# Patient Record
Sex: FEMALE | Race: BLACK OR AFRICAN AMERICAN | Employment: FULL TIME | URBAN - METROPOLITAN AREA
[De-identification: names, ages, dates, MRNs, and addresses within clinical notes are randomized per-mention and may not be internally consistent; named-entity substitution may affect disease eponyms.]

---

## 2019-04-18 ENCOUNTER — OFFICE VISIT (OUTPATIENT)
Dept: ORTHOPEDIC SURGERY | Age: 59
End: 2019-04-18

## 2019-04-18 VITALS
SYSTOLIC BLOOD PRESSURE: 144 MMHG | HEIGHT: 66 IN | TEMPERATURE: 97.8 F | RESPIRATION RATE: 17 BRPM | BODY MASS INDEX: 41.21 KG/M2 | HEART RATE: 87 BPM | WEIGHT: 256.4 LBS | DIASTOLIC BLOOD PRESSURE: 122 MMHG | OXYGEN SATURATION: 95 %

## 2019-04-18 DIAGNOSIS — M25.562 CHRONIC PAIN OF LEFT KNEE: ICD-10-CM

## 2019-04-18 DIAGNOSIS — M17.0 PRIMARY OSTEOARTHRITIS OF BOTH KNEES: Primary | ICD-10-CM

## 2019-04-18 DIAGNOSIS — G89.29 CHRONIC PAIN OF RIGHT KNEE: ICD-10-CM

## 2019-04-18 DIAGNOSIS — M25.561 CHRONIC PAIN OF RIGHT KNEE: ICD-10-CM

## 2019-04-18 DIAGNOSIS — G89.29 CHRONIC PAIN OF LEFT KNEE: ICD-10-CM

## 2019-04-18 PROBLEM — E66.01 OBESITY, MORBID (HCC): Status: ACTIVE | Noted: 2019-04-18

## 2019-04-18 RX ORDER — TRIAMCINOLONE ACETONIDE 40 MG/ML
40 INJECTION, SUSPENSION INTRA-ARTICULAR; INTRAMUSCULAR ONCE
Qty: 1 ML | Refills: 0
Start: 2019-04-18 | End: 2019-04-18

## 2019-04-18 NOTE — LETTER
NOTIFICATION RETURN TO WORK / SCHOOL 
 
4/18/2019 10:24 AM 
 
Ms. Nellie Kendrick [address] [place] [address] To Whom It May Concern: Nellie Kendrick is currently under the care of [address] [place] [address] Keith Frazier. She was seen at the office today, 4/18/19. Please excuse. If there are questions or concerns please have the patient contact our office. Sincerely, Nona Smyth MD

## 2019-04-18 NOTE — PROGRESS NOTES
Yoselyn Ventura 1960 Chief Complaint Patient presents with  Knee Pain ROLLY KNEE PAIN  
  
 
HISTORY OF PRESENT ILLNESS Yoselyn Ventura is a 63 y.o. female who presents today for evaluation of b/l knee pain. R>L. She rates her pain 4/10 today. Pain has been present for awhile, gradually getting worse. She has h/o of arthroscopic in the right knee years ago. She has had cortisone injections and Euflexxa injection in the past. Patient describes the pain as aching and dull that is Intermittent in nature. Symptoms are worse with standing and walking , Activity and is better with  Rest. Associated symptoms include Swelling. Since problem started, it: has worsened. Pain does not wake patient up at night. Has taken no meds for the problem. Has tried following treatments: Injections:NO; Brace:NO; Therapy:NO; Cane/Crutch:NO No Known Allergies Past Medical History:  
Diagnosis Date  Arthritis Social History Socioeconomic History  Marital status: SINGLE Spouse name: Not on file  Number of children: Not on file  Years of education: Not on file  Highest education level: Not on file Occupational History  Not on file Social Needs  Financial resource strain: Not on file  Food insecurity:  
  Worry: Not on file Inability: Not on file  Transportation needs:  
  Medical: Not on file Non-medical: Not on file Tobacco Use  Smoking status: Current Every Day Smoker  Smokeless tobacco: Never Used Substance and Sexual Activity  Alcohol use: Not on file  Drug use: Not on file  Sexual activity: Not on file Lifestyle  Physical activity:  
  Days per week: Not on file Minutes per session: Not on file  Stress: Not on file Relationships  Social connections:  
  Talks on phone: Not on file Gets together: Not on file Attends Yazidi service: Not on file Active member of club or organization: Not on file Attends meetings of clubs or organizations: Not on file Relationship status: Not on file  Intimate partner violence:  
  Fear of current or ex partner: Not on file Emotionally abused: Not on file Physically abused: Not on file Forced sexual activity: Not on file Other Topics Concern  Not on file Social History Narrative  Not on file Past Surgical History:  
Procedure Laterality Date  HX KNEE ARTHROSCOPY Family History Problem Relation Age of Onset  Heart Disease Mother  Diabetes Mother  No Known Problems Father Current Outpatient Medications Medication Sig  
 triamcinolone acetonide (KENALOG) 40 mg/mL injection 1 mL by IntraMUSCular route once for 1 dose. No current facility-administered medications for this visit. REVIEW OF SYSTEM Patient denies: Weight loss, Fever/Chills, HA, Visual changes, Fatigue, Chest pain, SOB, Abdominal pain, N/V/D/C, Blood in stool or urine, Edema. Pertinent positive as above in HPI. All others were negative PHYSICAL EXAM:  
Visit Vitals BP (!) 144/122 Pulse 87 Temp 97.8 °F (36.6 °C) (Oral) Resp 17 Ht 5' 5.5\" (1.664 m) Wt 256 lb 6.4 oz (116.3 kg) SpO2 95% BMI 42.02 kg/m² The patient is a well-developed, well-nourished female   in no acute distress. The patient is alert and oriented times three. The patient is alert and oriented times three. Mood and affect are normal. 
LYMPHATIC: lymph nodes are not enlarged and are within normal limits SKIN: normal in color and non tender to palpation. There are no bruises or abrasions noted. NEUROLOGICAL: Motor sensory exam is within normal limits. Reflexes are equal bilaterally. There is normal sensation to pinprick and light touch MUSCULOSKELETAL: 
Examination Left knee Right knee Skin Intact Intact Range of motion   Effusion + + Medial joint line tenderness + + Lateral joint line tenderness + + Tenderness Pes Bursa - - Tenderness insertion MCL - - Tenderness insertion LCL - - Marimars - - Patella crepitus + + Patella grind + + Lachman - - Pivot shift - - Anterior drawer - - Posterior drawer - - Varus stress - -  
Valgus stress - - Neurovascular Intact Intact Calf Swelling and Tenderness to Palpation - - Jermaine's Test - -  
Hamstring Cord Tightness - - PROCEDURE: After sterile prep, 4 cc of Xylocaine and 1 cc of Kenalog were injected into the bilateral  knees. [facility] PROCEDURE PROGRESS NOTE Chart reviewed for the following: 
Tj Adams MD, have reviewed the History, Physical and updated the Allergic reactions for Loreto Barefoot TIME OUT performed immediately prior to start of procedure: 
Tj Adams MD, have performed the following reviews on Loreto Barefoot prior to the start of the procedure: 
         
* Patient was identified by name and date of birth * Agreement on procedure being performed was verified * Risks and Benefits explained to the patient * Procedure site verified and marked as necessary * Patient was positioned for comfort * Consent was signed and verified Time: 10:05 AM 
 
Date of procedure: 4/18/2019 Procedure performed by:  Trevon Wilson MD 
 
Provider assisted by: (see medication administration) How tolerated by patient: tolerated the procedure well with no complications Comments: none IMAGING: XR of b/l knee dated 4/18/19 was reviewed and read: decreased medial joint line space with degenerative spurring and moderately severe degenerative changes in the patellofemoral joint. IMPRESSION:   
  ICD-10-CM ICD-9-CM 1. Primary osteoarthritis of both knees M17.0 715.16 TRIAMCINOLONE ACETONIDE INJ  
   triamcinolone acetonide (KENALOG) 40 mg/mL injection DRAIN/INJECT LARGE JOINT/BURSA    PROCEDURE AUTHORIZATION TO   
 2. Chronic pain of right knee M25.561 719.46 AMB POC XRAY, KNEE; 1/2 VIEWS  
 G89.29 338.29   
3. Chronic pain of left knee M25.562 719.46 AMB POC XRAY, KNEE; 1/2 VIEWS  
 G89.29 338.29 PLAN: 
1. The patient presents today with b/l knee pain due to XR documented osteoarthritis and I would like to try cortisone injections today. Risk factors include: n/a 2. No ultrasound exam indicated today 3. Yes cortisone injection indicated today B/L KNEES 4. No Physical/Occupational Therapy indicated today 5. No diagnostic test indicated today: 6. No durable medical equipment indicated today 7. No referral indicated today 8. No medications indicated today: 9. No Narcotic indicated today RTC 3 weeks if pain continues Scribed by Namrata Wang ([address]) as dictated by Pritesh Hyman MD 
 
I, Dr. Pritesh Hyman, confirm that all documentation is accurate.  
 
Pritesh Hyman M.D.  
[facility]

## 2019-04-18 NOTE — PROGRESS NOTES
1. Have you been to the ER, urgent care clinic since your last visit? Hospitalized since your last visit? No 
 
2. Have you seen or consulted any other health care providers outside of the [address] [place] [address] since your last visit? Include any pap smears or colon screening.  No

## 2019-05-09 ENCOUNTER — OFFICE VISIT (OUTPATIENT)
Dept: ORTHOPEDIC SURGERY | Age: 59
End: 2019-05-09

## 2019-05-09 VITALS
HEART RATE: 67 BPM | BODY MASS INDEX: 40.72 KG/M2 | WEIGHT: 253.4 LBS | OXYGEN SATURATION: 100 % | SYSTOLIC BLOOD PRESSURE: 130 MMHG | DIASTOLIC BLOOD PRESSURE: 87 MMHG | HEIGHT: 66 IN | TEMPERATURE: 98 F

## 2019-05-09 DIAGNOSIS — M54.16 LUMBAR RADICULOPATHY: ICD-10-CM

## 2019-05-09 DIAGNOSIS — M17.0 PRIMARY OSTEOARTHRITIS OF BOTH KNEES: Primary | ICD-10-CM

## 2019-05-09 RX ORDER — CELECOXIB 200 MG/1
200 CAPSULE ORAL 2 TIMES DAILY WITH MEALS
Qty: 60 CAP | Refills: 0 | Status: SHIPPED | OUTPATIENT
Start: 2019-05-09 | End: 2019-06-09 | Stop reason: SDUPTHER

## 2019-05-09 NOTE — PROGRESS NOTES
Danielito Jennings  1960   Chief Complaint   Patient presents with    Knee Pain     Bilateral        HISTORY OF PRESENT ILLNESS  Danielito Jennings is a 63 y.o. female who presents today for reevaluation of b/l knee pain. Patient rates pain as 2/10 today. At last OV, patient had a cortisone injection which provided good relief. She states her left leg goes numb after she does prolonged walking. Pain has been present for awhile, gradually getting worse. She has h/o of arthroscopic in the right knee years ago. She has had cortisone injections and Euflexxa injection in the past. Patient denies any fever, chills, chest pain, shortness of breath or calf pain. The remainder of the review of systems is negative. There are no new illness or injuries to report since last seen in the office. There are no changes to medications, allergies, family or social history. PHYSICAL EXAM:   Visit Vitals  /87 (BP 1 Location: Right arm, BP Patient Position: Sitting)   Pulse 67   Temp 98 °F (36.7 °C) (Oral)   Ht 5' 5.5\" (1.664 m)   Wt 253 lb 6.4 oz (114.9 kg)   SpO2 100%   BMI 41.53 kg/m²     The patient is a well-developed, well-nourished female   in no acute distress. The patient is alert and oriented times three. The patient is alert and oriented times three. Mood and affect are normal.  LYMPHATIC: lymph nodes are not enlarged and are within normal limits  SKIN: normal in color and non tender to palpation. There are no bruises or abrasions noted. NEUROLOGICAL: Motor sensory exam is within normal limits. Reflexes are equal bilaterally.  There is normal sensation to pinprick and light touch  MUSCULOSKELETAL:  Examination Left knee Right knee   Skin Intact Intact   Range of motion     Effusion - -   Medial joint line tenderness - -   Lateral joint line tenderness - -   Tenderness Pes Bursa - -   Tenderness insertion MCL - -   Tenderness insertion LCL - -   Marimars - -   Patella crepitus + +   Patella grind + +   Lachman - -   Pivot shift - -   Anterior drawer - -   Posterior drawer - -   Varus stress - -   Valgus stress - -   Neurovascular Intact Intact   Calf Swelling and Tenderness to Palpation - -   Jermaine's Test - -   Hamstring Cord Tightness - -     IMAGING: XR of b/l knee dated 4/18/19 was reviewed and read: decreased medial joint line space with degenerative spurring and moderately severe degenerative changes in the patellofemoral joint. IMPRESSION:      ICD-10-CM ICD-9-CM    1. Primary osteoarthritis of both knees M17.0 715.16 celecoxib (CELEBREX) 200 mg capsule   2. Lumbar radiculopathy M54.16 724.4 REFERRAL TO SPINE SURGERY      celecoxib (CELEBREX) 200 mg capsule        PLAN:   1. The patient presents today with b/l knee pain which was helped by the cortisone injections. Risk factors include: n/a  2. No ultrasound exam indicated today  3. No cortisone injection indicated today   4. No Physical/Occupational Therapy indicated today  5. No diagnostic test indicated today:   6. Yes durable medical equipment indicated today  7. Yes referral indicated today [place]  8. Yes medications indicated today: CELEBREX  9. No Narcotic indicated today    RTC prn    Scribed by Colton Holbrook [facility]) as dictated by Allegra Srinivasan MD    I, Dr. Allegra Srinivasan, confirm that all documentation is accurate.     Allegra Srinivasan M.D.   [address] [place] [address] and Spine Specialist

## 2019-05-10 ENCOUNTER — TELEPHONE (OUTPATIENT)
Dept: ORTHOPEDIC SURGERY | Age: 59
End: 2019-05-10

## 2019-05-10 NOTE — TELEPHONE ENCOUNTER
Submitted prior auth through covermymeds: [place] Case ID: [id number] - Rx #: [id number]. Prior auth approved, Status:Approved; Review Type:Prior Auth; Coverage Start Date:04/10/2019; Coverage End Date:05/09/2020;

## 2019-05-10 NOTE — TELEPHONE ENCOUNTER
Please use South: Kamila Villanueva to initiate a prior authorization for Celebrex through Cover My Meds or contact the pharmacy to change the medication.

## 2019-06-09 DIAGNOSIS — M17.0 PRIMARY OSTEOARTHRITIS OF BOTH KNEES: ICD-10-CM

## 2019-06-09 DIAGNOSIS — M54.16 LUMBAR RADICULOPATHY: ICD-10-CM

## 2019-06-10 RX ORDER — CELECOXIB 200 MG/1
CAPSULE ORAL
Qty: 60 CAP | Refills: 0 | Status: SHIPPED | OUTPATIENT
Start: 2019-06-10

## 2019-07-31 ENCOUNTER — OFFICE VISIT (OUTPATIENT)
Dept: ORTHOPEDIC SURGERY | Age: 59
End: 2019-07-31

## 2019-07-31 VITALS
DIASTOLIC BLOOD PRESSURE: 84 MMHG | HEIGHT: 66 IN | SYSTOLIC BLOOD PRESSURE: 140 MMHG | TEMPERATURE: 98 F | HEART RATE: 75 BPM | BODY MASS INDEX: 41.5 KG/M2 | RESPIRATION RATE: 12 BRPM | OXYGEN SATURATION: 97 % | WEIGHT: 258.2 LBS

## 2019-07-31 DIAGNOSIS — M17.0 PRIMARY OSTEOARTHRITIS OF BOTH KNEES: ICD-10-CM

## 2019-07-31 DIAGNOSIS — G57.12 MERALGIA PARAESTHETICA, LEFT: Primary | ICD-10-CM

## 2019-07-31 RX ORDER — DICLOFENAC SODIUM 10 MG/G
4 GEL TOPICAL 4 TIMES DAILY
Qty: 500 G | Refills: 1 | Status: SHIPPED | OUTPATIENT
Start: 2019-07-31

## 2019-07-31 NOTE — PROGRESS NOTES
Verbal Order per Dr. Maryam Larson placed Voltaren gel 4 gram to affected areas QID dispensed 500 grams with 1 refill.  Prescription printed per patient request.

## 2019-07-31 NOTE — PROGRESS NOTES
[address]  [place], [address] [place] [address] Street  Phone: [phone]  Fax: [phone]  : 1960  PCP: Rekha Monsalve MD      NEW PATIENT      ASSESSMENT AND PLAN     Diagnoses and all orders for this visit:    1. Meralgia paraesthetica, left  -     diclofenac (VOLTAREN) 1 % gel; Apply 4 g to affected area four (4) times daily. 2. Primary osteoarthritis of both knees  -     diclofenac (VOLTAREN) 1 % gel; Apply 4 g to affected area four (4) times daily. 1. Advised to stay active as tolerated. 2. Trial of Voltaren gel  3. Given information on hip exercises    F/U PRN      CHIEF COMPLAINT  Selene Silverman is seen today in consultation at the request of Dr. Emerita Pepe for complaints of LLE numbness. HISTORY OF PRESENT ILLNESS  Selene Silverman is a 63 y.o. female. RHD. Today pt c/o LLE numbness of several months duration. Pt denies any specific incident or injury that caused their pain. Known OA of knees, had benefit with cortisone and Euflexxa injections. Pt reports variable walking tolerance with LLE pain, affirms it generally does not restrict her. Pt denies low back pain. She describes her pain is in L anterior thigh, relieved with hitting it. She states she will have numbness in a line laterally. She c/o tingling and pain in RUE, especially in the morning. She admits to dropping objects, but denies this is frequent. Denies prolonged sitting , tool belt use, girdle use. Location of pain: B/L knee pain  Does pain radiate into extremities: L thigh pain and numbness intermittently. RUE tingling, especially in morning   Does patient have weakness: RLE give out, Drops objects from R hand. Pt denies saddle paresthesias. Medications pt is on: Celebrex 200mg qD. Muscle rub with benefit. Denies persistent fevers, chills, weight changes, neurogenic bowel or bladder symptoms.      Treatments patient has tried:  Physical therapy:Unknown  Doing HEP: Unknown  Non-opioid medications: Yes  Spinal injections: No  Spinal surgery- No.      reviewed. Pt works as  at a school. ED 7/29/19 for toxic reaction to hornets, wasps and bees. Pain Assessment  7/31/2019   Location of Pain Knee   Location Modifiers Left   Severity of Pain 6   Quality of Pain Aching;Burning   Quality of Pain Comment -   Duration of Pain A few minutes   Frequency of Pain Intermittent   Aggravating Factors Walking   Limiting Behavior Some   Relieving Factors Other (Comment)   Result of Injury No         PAST MEDICAL HISTORY   Past Medical History:   Diagnosis Date    Arthritis        Past Surgical History:   Procedure Laterality Date    HX KNEE ARTHROSCOPY         MEDICATIONS      Current Outpatient Medications   Medication Sig Dispense Refill    diclofenac (VOLTAREN) 1 % gel Apply 4 g to affected area four (4) times daily.  500 g 1    celecoxib (CELEBREX) 200 mg capsule TAKE 1 CAPSULE BY MOUTH TWO TIMES DAILY WITH MEALS 60 Cap 0       ALLERGIES  No Known Allergies       SOCIAL HISTORY    Social History     Socioeconomic History    Marital status: SINGLE     Spouse name: Not on file    Number of children: Not on file    Years of education: Not on file    Highest education level: Not on file   Occupational History    Not on file   Social Needs    Financial resource strain: Not on file    Food insecurity:     Worry: Not on file     Inability: Not on file    Transportation needs:     Medical: Not on file     Non-medical: Not on file   Tobacco Use    Smoking status: Current Every Day Smoker    Smokeless tobacco: Never Used    Tobacco comment: 5-6 cigs daily   Substance and Sexual Activity    Alcohol use: Not Currently    Drug use: Never    Sexual activity: Not on file   Lifestyle    Physical activity:     Days per week: Not on file     Minutes per session: Not on file    Stress: Not on file   Relationships    Social connections:     Talks on phone: Not on file     Gets together: Not on file     Attends Mormon service: Not on file     Active member of club or organization: Not on file     Attends meetings of clubs or organizations: Not on file     Relationship status: Not on file    Intimate partner violence:     Fear of current or ex partner: Not on file     Emotionally abused: Not on file     Physically abused: Not on file     Forced sexual activity: Not on file   Other Topics Concern     Service Not Asked    Blood Transfusions Not Asked    Caffeine Concern Not Asked    Occupational Exposure Not Asked   Mart[place] Hazards Not Asked    Sleep Concern Not Asked    Stress Concern Not Asked    Weight Concern Not Asked    Special Diet Not Asked    Back Care Not Asked    Exercise Not Asked    Bike Helmet Not Asked    Seat Belt Not Asked    Self-Exams Not Asked   Social History Narrative    Not on file       FAMILY HISTORY    Family History   Problem Relation Age of Onset    Heart Disease Mother     Diabetes Mother     No Known Problems Father          REVIEW OF SYSTEMS  Review of Systems   Constitutional: Negative for chills, fever and weight loss. Respiratory: Negative for shortness of breath. Cardiovascular: Negative for chest pain. Gastrointestinal: Negative for constipation. Negative for fecal incontinence   Genitourinary: Negative for dysuria. Negative for urinary incontinence   Musculoskeletal:        Per HPI   Skin: Negative for rash. Neurological: Positive for tingling. Negative for dizziness, tremors, focal weakness and headaches. Endo/Heme/Allergies: Does not bruise/bleed easily. Psychiatric/Behavioral: The patient does not have insomnia. PHYSICAL EXAMINATION  Visit Vitals  /84   Pulse 75   Temp 98 °F (36.7 °C) (Oral)   Resp 12   Ht 5' 5.5\" (1.664 m)   Wt 258 lb 3.2 oz (117.1 kg)   SpO2 97%   BMI 42.31 kg/m²          Accompanied by self.      Constitutional: Well developed, well nourished, in no acute distress. Psychiatric: Affect and mood are appropriate. Integumentary: No rashes or abrasions noted on exposed areas. Cardiovascular/Peripheral Vascular: No peripheral edema is noted BLE. SPINE/MUSCULOSKELETAL EXAM    Cervical spine:  Neck is midline. Normal muscle tone. No focal atrophy is noted. Tenderness to palpation none cervical spine. Negative Spurling's sign. Negative Tinel's sign. Negative Matos's sign. Lumbar spine:  No rash, ecchymosis, or gross obliquity. No fasciculations. No focal atrophy is noted. Tenderness to palpation none lumbar spine. No tenderness to palpation at the sciatic notch. SI joints non-tender. Trochanters non tender. MOTOR:      Biceps  Triceps Deltoids Wrist Ext Wrist Flex Hand Intrin   Right +4/5 +4/5 +4/5 +4/5 +4/5 +4/5   Left +4/5 +4/5 +4/5 +4/5 +4/5 +4/5        Hip Flex  Quads Hamstrings Ankle DF EHL Ankle PF   Right +4/5 +4/5 +4/5 +4/5 +4/5 +4/5   Left +4/5 +4/5 +4/5 +4/5 +4/5 +4/5     DTRs are hypoactive patella, and Achilles. Straight Leg raise negative. Ambulation without assistive device. FWB. Written by Alana Jackman, as dictated by Mardee Koyanagi, MD.    I, Dr. Mardee Koyanagi, MD, confirm that all documentation is accurate. Ms. Kayla Hernandez may have a reminder for a \"due or due soon\" health maintenance. I have asked that she contact her primary care provider for follow-up on this health maintenance.

## 2019-07-31 NOTE — PATIENT INSTRUCTIONS
Hip Arthritis: Exercises Introduction Here are some examples of exercises for you to try. The exercises may be suggested for a condition or for rehabilitation. Start each exercise slowly. Ease off the exercises if you start to have pain. You will be told when to start these exercises and which ones will work best for you. How to do the exercises Straight-leg raises to the outside 1. Lie on your side, with your affected hip on top. 2. Tighten the front thigh muscles of your top leg to keep your knee straight. 3. Keep your hip and your leg straight in line with the rest of your body, and keep your knee pointing forward. Do not drop your hip back. 4. Lift your top leg straight up toward the ceiling, about 12 inches off the floor. Hold for about 6 seconds, then slowly lower your leg. 5. Repeat 8 to 12 times. 6. Switch legs and repeat steps 1 through 5, even if only one hip is sore. Straight-leg raises to the inside 1. Lie on your side with your affected hip on the floor. 2. You can either prop up your other leg on a chair, or you can bend that knee and put that foot in front of your other knee. Do not drop your hip back. 3. Tighten the muscles on the front thigh of your bottom leg to straighten that knee. 4. Keep your kneecap pointing forward and your leg straight, and lift your bottom leg up toward the ceiling about 6 inches. Hold for about 6 seconds, then lower slowly. 5. Repeat 8 to 12 times. 6. Switch legs and repeat steps 1 through 5, even if only one hip is sore. Hip hike 1. Stand sideways on the bottom step of a staircase, and hold on to the banister or wall. 2. Keeping both knees straight, lift your good leg off the step and let it hang down. Then hike your good hip up to the same level as your affected hip or a little higher. 3. Repeat 8 to 12 times. 4. Switch legs and repeat steps 1 through 3, even if only one hip is sore. Bridging 1. Lie on your back with both knees bent. Your knees should be bent about 90 degrees. 2. Then push your feet into the floor, squeeze your buttocks, and lift your hips off the floor until your shoulders, hips, and knees are all in a straight line. 3. Hold for about 6 seconds as you continue to breathe normally, and then slowly lower your hips back down to the floor and rest for up to 10 seconds. 4. Repeat 8 to 12 times. Hamstring stretch (lying down) 1. Lie flat on your back with your legs straight. If you feel discomfort in your back, place a small towel roll under your lower back. 2. Holding the back of your affected leg, lift your leg straight up and toward your body until you feel a stretch at the back of your thigh. 3. Hold the stretch for at least 30 seconds. 4. Repeat 2 to 4 times. 5. Switch legs and repeat steps 1 through 4, even if only one hip is sore. Standing quadriceps stretch 1. If you are not steady on your feet, hold on to a chair, counter, or wall. You can also lie on your stomach or your side to do this exercise. 2. Bend the knee of the leg you want to stretch, and reach behind you to grab the front of your foot or ankle with the hand on the same side. For example, if you are stretching your right leg, use your right hand. 3. Keeping your knees next to each other, pull your foot toward your buttock until you feel a gentle stretch across the front of your hip and down the front of your thigh. Your knee should be pointed directly to the ground, and not out to the side. 4. Hold the stretch for at least 15 to 30 seconds. 5. Repeat 2 to 4 times. 6. Switch legs and repeat steps 1 through 5, even if only one hip is sore. Hip rotator stretch 1. Lie on your back with both knees bent and your feet flat on the floor. 2. Put the ankle of your affected leg on your opposite thigh near your knee.  
3. Use your hand to gently push your knee away from your body until you feel a gentle stretch around your hip. 4. Hold the stretch for 15 to 30 seconds. 5. Repeat 2 to 4 times. 6. Repeat steps 1 through 5, but this time use your hand to gently pull your knee toward your opposite shoulder. 7. Switch legs and repeat steps 1 through 6, even if only one hip is sore. Knee-to-chest 
 
1. Lie on your back with your knees bent and your feet flat on the floor. 2. Bring your affected leg to your chest, keeping the other foot flat on the floor (or keeping the other leg straight, whichever feels better on your lower back). 3. Keep your lower back pressed to the floor. Hold for at least 15 to 30 seconds. 4. Relax, and lower the knee to the starting position. 5. Repeat 2 to 4 times. 6. Switch legs and repeat steps 1 through 5, even if only one hip is sore. 7. To get more stretch, put your other leg flat on the floor while pulling your knee to your chest. 
 
Clamshell 1. Lie on your side, with your affected hip on top. Keep your feet and knees together and your knees bent. 2. Raise your top knee, but keep your feet together. Do not let your hips roll back. Your legs should open up like a clamshell. 3. Hold for 6 seconds. 4. Slowly lower your knee back down. Rest for 10 seconds. 5. Repeat 8 to 12 times. 6. Switch legs and repeat steps 1 through 5, even if only one hip is sore. Follow-up care is a key part of your treatment and safety. Be sure to make and go to all appointments, and call your doctor if you are having problems. It's also a good idea to know your test results and keep a list of the medicines you take. Where can you learn more? Go to http://flaquita-roosevelt.info/. Enter H358 in the search box to learn more about \"Hip Arthritis: Exercises. \" Current as of: September 20, 2018 Content Version: 12.1 © 0464-8733 [employer].  Care instructions adapted under license by [employer] (which disclaims liability or warranty for this information). If you have questions about a medical condition or this instruction, always ask your healthcare professional. [place] [address] any warranty or liability for your use of this information.

## 2019-07-31 NOTE — PROGRESS NOTES
ROOM # 6    Lea Light presents today for   Chief Complaint   Patient presents with    New Patient    Knee Pain     Pt complains of left knee pain accompanied with numbness of left thigh       Mari Bolton preferred language for health care discussion is english/other. Is someone accompanying this pt? no    Is the patient using any DME equipment during [address]? no    Depression Screening:  3 most recent PHQ Screens 7/31/2019 4/18/2019   Little interest or pleasure in doing things Not at all Not at all   Feeling down, depressed, irritable, or hopeless Not at all Not at all   Total Score PHQ 2 0 0       Learning Assessment:  No flowsheet data found. Abuse Screening:  No flowsheet data found. Fall Risk  No flowsheet data found. Health Maintenance reviewed and discussed per provider. Yes    Lea Light is due for   Health Maintenance Due   Topic Date Due    Hepatitis C Screening  1960    Pneumococcal 0-64 years (1 of 1 - PPSV23) 11/27/1967    PAP AKA CERVICAL CYTOLOGY  11/27/1982    Shingrix Vaccine Age 50> (1 of 2) 11/27/2011    BREAST CANCER SCRN MAMMOGRAM  11/27/2011    FOBT Q 1 YEAR AGE 50-75  11/27/2011    DTaP/Tdap/Td series (1 - Tdap) 06/19/2013         Please order/place referral if appropriate. Advance Directive:  1. Do you have an advance directive in place? Patient Reply: no    2. If not, would you like material regarding how to put one in place? Patient Reply: no    Coordination of Care:  1. Have you been to the ER, urgent care clinic since your last visit? Hospitalized since your last visit? Yes for wasp sting      2. Have you seen or consulted any other health care providers outside of the [address] [place] [address] since your last visit? Include any pap smears or colon screening.  no

## 2019-08-16 ENCOUNTER — TELEPHONE (OUTPATIENT)
Dept: ORTHOPEDIC SURGERY | Age: 59
End: 2019-08-16

## 2019-08-16 NOTE — TELEPHONE ENCOUNTER
Prior authorization was submitted through Cover My Meds for Voltaren gel 1 %. It was approved from 19 until 8/15/20. The [facility]: Katie Perez AND CASE I. Called patient and verified her name and date of birth. I informed patient that the voltaren gel has been approved and she should be able to get it from her pharmacy. Patient verbalized understanding.

## 2019-08-16 NOTE — TELEPHONE ENCOUNTER
Patient called stating that the pharmacy will not fill her prescription. She said the pharmacy needs a prior authorization/ confirmation. Please advsie patient at [phone].       Pharmacy: [facility]/PHARMACY [address] [place] [address]

## 2022-03-20 PROBLEM — E66.01 OBESITY, MORBID (HCC): Status: ACTIVE | Noted: 2019-04-18

## 2024-06-11 ENCOUNTER — OFFICE VISIT (OUTPATIENT)
Age: 64
End: 2024-06-11
Payer: COMMERCIAL

## 2024-06-11 VITALS — BODY MASS INDEX: 45.52 KG/M2 | HEIGHT: 65 IN | WEIGHT: 273.2 LBS

## 2024-06-11 DIAGNOSIS — M17.11 PRIMARY OSTEOARTHRITIS OF RIGHT KNEE: ICD-10-CM

## 2024-06-11 DIAGNOSIS — M25.561 RIGHT KNEE PAIN, UNSPECIFIED CHRONICITY: Primary | ICD-10-CM

## 2024-06-11 PROCEDURE — 20611 DRAIN/INJ JOINT/BURSA W/US: CPT | Performed by: ORTHOPAEDIC SURGERY

## 2024-06-11 PROCEDURE — 99203 OFFICE O/P NEW LOW 30 MIN: CPT | Performed by: ORTHOPAEDIC SURGERY

## 2024-06-11 RX ORDER — TRIAMCINOLONE ACETONIDE 40 MG/ML
40 INJECTION, SUSPENSION INTRA-ARTICULAR; INTRAMUSCULAR ONCE
Status: COMPLETED | OUTPATIENT
Start: 2024-06-11 | End: 2024-06-11

## 2024-06-11 RX ORDER — LIDOCAINE HYDROCHLORIDE 10 MG/ML
4 INJECTION, SOLUTION INFILTRATION; PERINEURAL ONCE
Status: COMPLETED | OUTPATIENT
Start: 2024-06-11 | End: 2024-06-11

## 2024-06-11 RX ORDER — CARVEDILOL 12.5 MG/1
12.5 TABLET ORAL 2 TIMES DAILY
COMMUNITY
Start: 2024-05-22

## 2024-06-11 RX ADMIN — LIDOCAINE HYDROCHLORIDE 4 ML: 10 INJECTION, SOLUTION INFILTRATION; PERINEURAL at 14:37

## 2024-06-11 RX ADMIN — TRIAMCINOLONE ACETONIDE 40 MG: 40 INJECTION, SUSPENSION INTRA-ARTICULAR; INTRAMUSCULAR at 14:37

## 2024-06-11 NOTE — PROGRESS NOTES
personally performed the services described in this documentation. I have authorized the scribe to complete the medical record entries input within this chart. I have reviewed the chart and agree that the record reflects my personal performance and is accurate and complete. [Electronically Signed: Jameson Quiros MD. 6/11/2024 2:13 PM EDT]      Jameson Quiros M.D.   [facility]